# Patient Record
Sex: FEMALE | Race: WHITE | NOT HISPANIC OR LATINO | Employment: OTHER | URBAN - METROPOLITAN AREA
[De-identification: names, ages, dates, MRNs, and addresses within clinical notes are randomized per-mention and may not be internally consistent; named-entity substitution may affect disease eponyms.]

---

## 2017-01-16 ENCOUNTER — HOSPITAL ENCOUNTER (OUTPATIENT)
Dept: LAB | Facility: MEDICAL CENTER | Age: 67
End: 2017-01-16
Attending: INTERNAL MEDICINE
Payer: MEDICARE

## 2017-01-16 DIAGNOSIS — E55.9 VITAMIN D DEFICIENCY DISEASE: ICD-10-CM

## 2017-01-16 DIAGNOSIS — D64.9 ANEMIA, UNSPECIFIED TYPE: ICD-10-CM

## 2017-01-16 DIAGNOSIS — T86.10 COMPLICATION OF TRANSPLANTED KIDNEY, UNSPECIFIED COMPLICATION: ICD-10-CM

## 2017-01-16 LAB
25(OH)D3 SERPL-MCNC: 19 NG/ML (ref 30–100)
ANION GAP SERPL CALC-SCNC: 7 MMOL/L (ref 0–11.9)
BUN SERPL-MCNC: 62 MG/DL (ref 8–22)
CALCIUM SERPL-MCNC: 9.9 MG/DL (ref 8.5–10.5)
CHLORIDE SERPL-SCNC: 113 MMOL/L (ref 96–112)
CO2 SERPL-SCNC: 15 MMOL/L (ref 20–33)
CREAT SERPL-MCNC: 2.27 MG/DL (ref 0.5–1.4)
ERYTHROCYTE [DISTWIDTH] IN BLOOD BY AUTOMATED COUNT: 51.5 FL (ref 35.9–50)
GLUCOSE SERPL-MCNC: 116 MG/DL (ref 65–99)
HCT VFR BLD AUTO: 46 % (ref 37–47)
HGB BLD-MCNC: 15.1 G/DL (ref 12–16)
MCH RBC QN AUTO: 31.2 PG (ref 27–33)
MCHC RBC AUTO-ENTMCNC: 32.8 G/DL (ref 33.6–35)
MCV RBC AUTO: 95 FL (ref 81.4–97.8)
PLATELET # BLD AUTO: 218 K/UL (ref 164–446)
PMV BLD AUTO: 11.5 FL (ref 9–12.9)
POTASSIUM SERPL-SCNC: 5.2 MMOL/L (ref 3.6–5.5)
PTH-INTACT SERPL-MCNC: 93.4 PG/ML (ref 14–72)
RBC # BLD AUTO: 4.84 M/UL (ref 4.2–5.4)
SODIUM SERPL-SCNC: 135 MMOL/L (ref 135–145)
WBC # BLD AUTO: 4.7 K/UL (ref 4.8–10.8)

## 2017-01-16 PROCEDURE — 83970 ASSAY OF PARATHORMONE: CPT

## 2017-01-16 PROCEDURE — 82306 VITAMIN D 25 HYDROXY: CPT

## 2017-01-16 PROCEDURE — 36415 COLL VENOUS BLD VENIPUNCTURE: CPT

## 2017-01-16 PROCEDURE — 80048 BASIC METABOLIC PNL TOTAL CA: CPT

## 2017-01-16 PROCEDURE — 85027 COMPLETE CBC AUTOMATED: CPT

## 2017-01-19 ENCOUNTER — HOSPITAL ENCOUNTER (OUTPATIENT)
Dept: LAB | Facility: MEDICAL CENTER | Age: 67
End: 2017-01-19
Attending: INTERNAL MEDICINE
Payer: MEDICARE

## 2017-01-19 ENCOUNTER — OFFICE VISIT (OUTPATIENT)
Dept: NEPHROLOGY | Facility: MEDICAL CENTER | Age: 67
End: 2017-01-19
Payer: MEDICARE

## 2017-01-19 VITALS
HEIGHT: 62 IN | HEART RATE: 60 BPM | DIASTOLIC BLOOD PRESSURE: 68 MMHG | SYSTOLIC BLOOD PRESSURE: 100 MMHG | TEMPERATURE: 97.6 F | WEIGHT: 133 LBS | BODY MASS INDEX: 24.48 KG/M2 | RESPIRATION RATE: 12 BRPM

## 2017-01-19 DIAGNOSIS — I10 ESSENTIAL HYPERTENSION: ICD-10-CM

## 2017-01-19 DIAGNOSIS — N17.9 ACUTE ON CHRONIC RENAL FAILURE (HCC): ICD-10-CM

## 2017-01-19 DIAGNOSIS — N18.9 ACUTE ON CHRONIC RENAL FAILURE (HCC): ICD-10-CM

## 2017-01-19 DIAGNOSIS — E87.20 METABOLIC ACIDOSIS: ICD-10-CM

## 2017-01-19 DIAGNOSIS — T86.10 COMPLICATION OF TRANSPLANTED KIDNEY, UNSPECIFIED COMPLICATION: ICD-10-CM

## 2017-01-19 LAB
ANION GAP SERPL CALC-SCNC: 7 MMOL/L (ref 0–11.9)
BUN SERPL-MCNC: 63 MG/DL (ref 8–22)
CALCIUM SERPL-MCNC: 9.8 MG/DL (ref 8.5–10.5)
CHLORIDE SERPL-SCNC: 113 MMOL/L (ref 96–112)
CO2 SERPL-SCNC: 18 MMOL/L (ref 20–33)
CREAT SERPL-MCNC: 2.11 MG/DL (ref 0.5–1.4)
GLUCOSE SERPL-MCNC: 90 MG/DL (ref 65–99)
POTASSIUM SERPL-SCNC: 5.2 MMOL/L (ref 3.6–5.5)
SODIUM SERPL-SCNC: 138 MMOL/L (ref 135–145)

## 2017-01-19 PROCEDURE — 99214 OFFICE O/P EST MOD 30 MIN: CPT | Performed by: INTERNAL MEDICINE

## 2017-01-19 PROCEDURE — 36415 COLL VENOUS BLD VENIPUNCTURE: CPT

## 2017-01-19 PROCEDURE — 80048 BASIC METABOLIC PNL TOTAL CA: CPT

## 2017-01-19 NOTE — MR AVS SNAPSHOT
"        Jo-Ann Cabrera   2017 10:45 AM   Office Visit   MRN: [id number]    Department:  [facility]   Dept Phone:  [phone]    Description:  Female : 1950   Provider:  Hiwot Brenner M.D.           Reason for Visit     Follow-Up           Allergies as of 2017     Allergen Noted Reactions    Codeine 2013   Rash    Rxn = unknown    Rapamune 2013       unk reaction    Statins [Hmg-Coa-R Inhibitors] 2015       LFT elevation    Pneumococcal Vaccine 2014       States cold symptoms      You were diagnosed with     Acute on chronic renal failure (CMS-HCC)   [592296]       Complication of transplanted kidney, unspecified complication   [3016046]       Metabolic acidosis   [221218]       Essential hypertension   [9613867]         Vital Signs     Blood Pressure Pulse Temperature Respirations Height Weight    100/68 mmHg 60 36.4 °C (97.6 °F) (Temporal) 12 1.575 m (5' 2\") 60.328 kg (133 lb)    Body Mass Index Last Menstrual Period Smoking Status             24.32 kg/m2 2000 Never Smoker          Basic Information     Date Of Birth Sex Race Ethnicity Preferred Language    1950 Female White Non- English      Your appointments     2017 10:00 AM   Follow Up Visit with Hiwot Brenner M.D.   [facility] ([address] [place] [address])    [facility] [address]  [facility] [id number] [phone]           You will be receiving a confirmation call a few days before your appointment from our automated call confirmation system.              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 T86.10 Medium  2013 - Present    Coronary artery disease I25.10 Medium  2013 - Present    GERD (gastroesophageal reflux disease) K21.9 Low  2013 - Present    CAD S/P percutaneous coronary angioplasty I25.10, Z98.61   2013 - Present    Immunosuppressed " status ([facility]) D89.9 Medium  5/21/2014 - Present    Acute on chronic renal failure (HCC) N17.9, N18.9 High  8/8/2014 - Present    Anemia of renal disease D63.1 Low  8/8/2014 - Present    Hypokalemia E87.6 Medium  8/8/2014 - Present    Leukopenia D72.819 Medium  8/8/2014 - Present    Hypocalcemia E83.51 Low  8/8/2014 - Present    Hypoalbuminemia E88.09 Low  8/8/2014 - Present    Gastroenteritis K52.9 High  8/8/2014 - Present    Dyslipidemia E78.5 Low  8/8/2014 - Present    DM retinopathy (CMS-HCC) E11.319 Low  8/28/2014 - Present    Lower urinary tract infectious disease N39.0 High  9/21/2014 - Present    Metabolic acidosis E87.2 Medium  10/2/2014 - Present    Anemia D64.9 Medium  10/2/2014 - Present    HTN (hypertension) I10 Low  11/6/2014 - Present    UTI N39.0 High  11/20/2014 - Present    Intractable nausea and vomiting R11.2   11/20/2014 - Present    Hypokalemia E87.6   12/10/2014 - Present    Chronic kidney disease (CKD), stage III (moderate) N18.3   1/6/2015 - Present    CKD (chronic kidney disease), stage III N18.3   3/13/2015 - Present    DVT (deep venous thrombosis) (CMS-HCC) I82.409 High  11/13/2015 - Present    DVT (deep venous thrombosis), left I82.402   12/21/2015 - Present    Hyperparathyroidism due to vitamin D deficiency (CMS-HCC) E21.1   4/7/2016 - Present    Vitamin deficiency E56.9   4/7/2016 - Present    Hyponatremia E87.1 Medium  7/25/2016 - Present    IRISH (acute kidney injury) (CMS-HCC) N17.9 High  7/26/2016 - Present    CKD (chronic kidney disease) N18.9 Medium  7/26/2016 - Present    Immunosuppressed status (CMS-HCC) D89.9 Low  7/26/2016 - Present    History of deep venous thrombosis Z86.718 Low  7/26/2016 - Present    GERD (gastroesophageal reflux disease) K21.9 Low  7/26/2016 - Present    CAD (coronary artery disease) I25.10 Medium  7/26/2016 - Present    Glaucoma H40.9 Low  7/26/2016 - Present    History of renal transplantation Z94.0 Low  7/26/2016 - Present    Vitamin D deficiency disease  E55.9   11/17/2016 - Present    ARF (acute renal failure) (CMS-HCC) N17.9   11/17/2016 - Present    DM type 1 (diabetes mellitus, type 1) (CMS-HCC) E10.9 Medium  11/18/2016 - Present    Type 1 diabetes mellitus with kidney complication (CMS-HCC) E10.29   12/1/2016 - Present    Essential hypertension I10   1/19/2017 - Present      Health Maintenance        Date Due Completion Dates    A1C SCREENING 1950 ---    DIABETES MONOFILAMENT / LE EXAM 1950 ---    URINE ACR / MICROALBUMIN 6/23/1966 ---    IMM DTaP/Tdap/Td Vaccine (1 - Tdap) 6/23/1967 ---    PAP SMEAR 6/23/1969 ---    IMM ZOSTER VACCINE 6/23/2008 ---    BONE DENSITY 6/23/2013 ---    RETINAL SCREENING 2/10/2015 2/10/2014 (Prv Comp)    Override on 2/10/2014: Previously completed    IMM PNEUMOCOCCAL 65+ (ADULT) HIGH/HIGHEST RISK SERIES (2 of 2 - PCV13) 9/22/2015 9/22/2014    IMM INFLUENZA (1) 9/1/2016 10/13/2015, 11/22/2014, 11/25/2013    MAMMOGRAM 5/27/2017 5/27/2016, 5/14/2015, 4/9/2014, 4/9/2014, 4/9/2014, 4/9/2014, 4/2/2014, 3/25/2012 (Prv Comp)    Override on 3/25/2012: Previously completed    FASTING LIPID PROFILE 6/14/2017 6/14/2016, 11/18/2015, 7/9/2015, 6/30/2015, 3/6/2015, 5/22/2014, 11/20/2013    SERUM CREATININE 1/16/2018 1/16/2017, 12/27/2016, 11/20/2016, 11/19/2016, 11/18/2016, 11/17/2016, 11/17/2016, 9/22/2016, 8/9/2016, 7/27/2016, 7/26/2016, 7/25/2016, 7/24/2016, 6/14/2016, 6/14/2016, 6/14/2016, 3/15/2016, 12/15/2015, 11/17/2015, 11/16/2015, 11/15/2015, 11/14/2015, 11/13/2015, 10/1/2015, 9/1/2015, 8/25/2015, 7/9/2015, 6/30/2015, 6/30/2015, 3/6/2015, 3/6/2015, 1/2/2015, 12/11/2014, 12/10/2014, 12/9/2014, 12/8/2014, 12/7/2014, 12/7/2014, 12/6/2014, 12/3/2014, 12/3/2014, 11/26/2014, 11/25/2014, 11/24/2014, 11/23/2014, 11/22/2014, 11/21/2014, 11/21/2014, 11/20/2014, 10/30/2014, 9/29/2014, 9/25/2014, 9/24/2014, 9/23/2014, 9/22/2014, 9/21/2014, 8/13/2014, 8/12/2014, 8/11/2014, 8/10/2014, 8/9/2014, 8/8/2014, 8/7/2014, 7/29/2014, 6/2/2014,  5/29/2014, 5/23/2014, 5/22/2014, 5/21/2014, 12/16/2013, 12/9/2013, 11/21/2013, 11/20/2013, 11/19/2013, 10/31/2013, 9/11/2013    COLONOSCOPY 11/25/2020 11/25/2010 (Prv Comp)    Override on 11/25/2010: Previously completed            Current Immunizations     Influenza TIV (IM) 11/25/2013    Influenza Vaccine Adult HD 10/13/2015, 11/22/2014  9:53 AM    Pneumococcal polysaccharide vaccine (PPSV-23) 9/22/2014 12:45 AM      Below and/or attached are the medications your provider expects you to take. Review all of your home medications and newly ordered medications with your provider and/or pharmacist. Follow medication instructions as directed by your provider and/or pharmacist. Please keep your medication list with you and share with your provider. Update the information when medications are discontinued, doses are changed, or new medications (including over-the-counter products) are added; and carry medication information at all times in the event of emergency situations     Allergies:  CODEINE - Rash     RAPAMUNE - (reactions not documented)     STATINS - (reactions not documented)     PNEUMOCOCCAL VACCINE - (reactions not documented)               Medications  Valid as of: January 19, 2017 - 11:06 AM    Generic Name Brand Name Tablet Size Instructions for use    Aspirin (Chew Tab) ASA 81 MG Take 81 mg by mouth every morning.        Cholecalciferol (Tab) cholecalciferol 1000 UNIT Take 1,000 Units by mouth every day.        Dorzolamide HCl-Timolol Mal (Solution) COSOPT 22.3-6.8 MG/ML Place 1 Drop in both eyes every day.        Fludrocortisone Acetate (Tab) FLORINEF 0.1 MG Take 1 Tab by mouth every morning.        Metoprolol Tartrate (Tab) LOPRESSOR 50 MG TAKE ONE-HALF TABLET BY MOUTH TWICE DAILY        Multiple Vitamins-Minerals   Take 1 Cap by mouth every day.        Omeprazole (CAPSULE DELAYED RELEASE) PRILOSEC 40 MG Take 1 Cap by mouth 2 Times a Day.        PredniSONE (Tab) DELTASONE 10 MG Take 10 mg by mouth every  day.        Pyridoxine HCl   Take 1 Tab by mouth every day.        Sodium Bicarbonate   Take 10 g by mouth 3 times a day.        Tacrolimus (Cap) PROGRAF 1 MG Take 2 mg by mouth 2 times a day.        .                 Medicines prescribed today were sent to:     [facility] [address] [place] [address], [place] - [address] [facility]    [address] [facility] [place] [address]    Phone: [phone] Fax: [phone]    Open 24 Hours?: No      Medication refill instructions:       If your prescription bottle indicates you have medication refills left, it is not necessary to call your provider’s office. Please contact your pharmacy and they will refill your medication.    If your prescription bottle indicates you do not have any refills left, you may request refills at any time through one of the following ways: The online Clear2Pay system (except Urgent Care), by calling your provider’s office, or by asking your pharmacy to contact your provider’s office with a refill request. Medication refills are processed only during regular business hours and may not be available until the next business day. Your provider may request additional information or to have a follow-up visit with you prior to refilling your medication.   *Please Note: Medication refills are assigned a new Rx number when refilled electronically. Your pharmacy may indicate that no refills were authorized even though a new prescription for the same medication is available at the pharmacy. Please request the medicine by name with the pharmacy before contacting your provider for a refill.        Your To Do List     Future Labs/Procedures Complete By Expires    BASIC METABOLIC PANEL  As directed 7/19/2017         Clear2Pay Access Code: [email or web address]  Expires: 2/15/2017  1:37 PM    Clear2Pay  A secure, online tool to manage your health information     [employer]’s Clear2Pay® is a secure, online tool that connects you to your personalized health information from the privacy  of your home -- day or night - making it very easy for you to manage your healthcare. Once the activation process is completed, you can even access your medical information using the Cloud Security lavern, which is available for free in the Apple Lavern store or Google Play store.     Cloud Security provides the following levels of access (as shown below):   My Chart Features   [facility] Doctor [facility]  Specialists [facility]  Urgent  Care [facility]  Primary Care  Doctor   Email your healthcare team securely and privately 24/7 X X X    Manage appointments: schedule your next appointment; view details of past/upcoming appointments X      Request prescription refills. X      View recent personal medical records, including lab and immunizations X X X X   View health record, including health history, allergies, medications X X X X   Read reports about your outpatient visits, procedures, consult and ER notes X X X X   See your discharge summary, which is a recap of your hospital and/or ER visit that includes your diagnosis, lab results, and care plan. X X       How to register for Cloud Security:  1. Go to  [email or web address].  2. Click on the Sign Up Now box, which takes you to the New Member Sign Up page. You will need to provide the following information:  a. Enter your Cloud Security Access Code exactly as it appears at the top of this page. (You will not need to use this code after you’ve completed the sign-up process. If you do not sign up before the expiration date, you must request a new code.)   b. Enter your date of birth.   c. Enter your home email address.   d. Click Submit, and follow the next screen’s instructions.  3. Create a Cloud Security ID. This will be your Cloud Security login ID and cannot be changed, so think of one that is secure and easy to remember.  4. Create a Cloud Security password. You can change your password at any time.  5. Enter your Password Reset Question and Answer. This can be used at a later time if you forget your  password.   6. Enter your e-mail address. This allows you to receive e-mail notifications when new information is available in Five Below.  7. Click Sign Up. You can now view your health information.    For assistance activating your [employer] Below account, call [phone]

## 2017-01-19 NOTE — PROGRESS NOTES
"Subjective:      Jo-Ann Cabrera is a 66 y.o. female who presents with Follow-Up; Kidney Transplant Follow-Up; and Chronic Kidney Disease            HPI  Jo-Ann is coming today for f/u of CKD III,K/P transplant,  metabolic acidosis  Has hx/of dual renal/pancrease transplant in 2007, [place]  Recently treated with abx for bladder infection  No dysuria/hematuria  CKD III:creat worse to 2.2  HTN: BP well controlled.  Metabolic acidosis worse  Review of Systems   Constitutional: Improved malaise/fatigue. Negative for fever and chills.   HENT: Negative for congestion and neck pain.    Eyes: Negative for blurred vision and double vision.   Respiratory: Negative for cough, hemoptysis and wheezing.    Cardiovascular: Negative for chest pain, palpitations, orthopnea and leg swelling.   Gastrointestinal: Positive for nausea, vomiting and abdominal pain.   Genitourinary: Positivefor dysuria, urgency, frequency, hematuria and flank pain.   Musculoskeletal: Negative for myalgias and back pain.   Neurological: Negative for dizziness and headaches.     PMH/SH/FH/allergies and medications reviewed     Objective:     /68 mmHg  Pulse 60  Temp(Src) 36.4 °C (97.6 °F) (Temporal)  Resp 12  Ht 1.575 m (5' 2\")  Wt 60.328 kg (133 lb)  BMI 24.32 kg/m2  LMP 11/08/2000     Physical Exam   Constitutional: She is oriented to person, place, and time. She appears well-developed. No distress.        Thin female   HENT:   Head: Normocephalic and atraumatic.   Nose: Nose normal.   Mouth/Throat: Oropharynx is clear and moist.   Eyes: Conjunctivae normal and EOM are normal. Pupils are equal, round, and reactive to light. No scleral icterus.   Neck: Normal range of motion. Neck supple. No JVD present.   Cardiovascular: Normal rate and regular rhythm.  Exam reveals no gallop and no friction rub.    Pulmonary/Chest: Effort normal and breath sounds normal. No respiratory distress. She has no wheezes. She has no rales.   Abdominal: Soft. " Bowel sounds are normal. She exhibits no distension. There is tenderness in epigastric area   Musculoskeletal: She exhibits no edema and no tenderness.   Lymphadenopathy:     She has no cervical adenopathy.   Neurological: She is alert and oriented to person, place, and time. No cranial nerve deficit. Coordination normal.             Laboratory results reviewed: d/w patient  Lab Results   Component Value Date/Time    CREATININE 1.22 6/2/2014  8:04 AM    POTASSIUM 4.1 6/2/2014  8:04 AM   Serum creatinine level from 06/23/14 1.6, Hb 10    Lab Results   Component Value Date/Time    CREATININE 1.63* 9/29/2014  9:30 AM    POTASSIUM 4.2 9/29/2014  9:30 AM     Lab Results   Component Value Date/Time    CREATININE 2.07* 10/30/2014 11:58 AM    POTASSIUM 4.2 10/30/2014 11:58 AM   CO2 14    Lab Results   Component Value Date/Time    CREATININE 1.54* 1/2/2015 12:05 PM    POTASSIUM 4.5 1/2/2015 12:05 PM   CO2 15    Lab Results   Component Value Date/Time    CREATININE 1.40 3/6/2015  9:40 AM    POTASSIUM 4.2 3/6/2015  9:40 AM   CO2 21    Lab Results   Component Value Date/Time    CREATININE 2.27* 01/16/2017 01:45 PM    POTASSIUM 5.2 01/16/2017 01:45 PM               Assessment/Plan:     1. 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 Chronic kidney disease (CKD), stage III (moderate)  -in IRISH              Creatinine worse -likely volume depletion        3. HTN (hypertension)   BP well controlled    4. Anemia   Hb level stable    5. Vitamin D deficiency disease  - vit D level below the goal -to monitor              Continue supplementation  6.         Metabolic acidosis - worse -check BMP today      Recs: NaHCO3 1300 mg po TID             Check BMP today if creat/CO2 worse -instructed to go to ER for further treatment             Keep well hydrated             F/u in 2-3  months with BMP

## 2017-01-23 DIAGNOSIS — E87.20 METABOLIC ACIDOSIS: ICD-10-CM

## 2017-01-23 DIAGNOSIS — N17.9 AKI (ACUTE KIDNEY INJURY) (HCC): ICD-10-CM

## 2017-01-23 DIAGNOSIS — T86.10 COMPLICATION OF TRANSPLANTED KIDNEY, UNSPECIFIED COMPLICATION: ICD-10-CM

## 2017-01-23 DIAGNOSIS — N18.30 CHRONIC KIDNEY DISEASE (CKD), STAGE III (MODERATE) (HCC): ICD-10-CM

## 2017-01-26 ENCOUNTER — HOSPITAL ENCOUNTER (OUTPATIENT)
Dept: LAB | Facility: MEDICAL CENTER | Age: 67
End: 2017-01-26
Attending: INTERNAL MEDICINE
Payer: MEDICARE

## 2017-01-26 DIAGNOSIS — E87.20 METABOLIC ACIDOSIS: ICD-10-CM

## 2017-01-26 DIAGNOSIS — N18.30 CHRONIC KIDNEY DISEASE (CKD), STAGE III (MODERATE) (HCC): ICD-10-CM

## 2017-01-26 DIAGNOSIS — N17.9 AKI (ACUTE KIDNEY INJURY) (HCC): ICD-10-CM

## 2017-01-26 DIAGNOSIS — T86.10 COMPLICATION OF TRANSPLANTED KIDNEY, UNSPECIFIED COMPLICATION: ICD-10-CM

## 2017-01-26 LAB
ANION GAP SERPL CALC-SCNC: 6 MMOL/L (ref 0–11.9)
BUN SERPL-MCNC: 53 MG/DL (ref 8–22)
CALCIUM SERPL-MCNC: 9.4 MG/DL (ref 8.5–10.5)
CHLORIDE SERPL-SCNC: 118 MMOL/L (ref 96–112)
CO2 SERPL-SCNC: 16 MMOL/L (ref 20–33)
CREAT SERPL-MCNC: 2.06 MG/DL (ref 0.5–1.4)
GLUCOSE SERPL-MCNC: 86 MG/DL (ref 65–99)
POTASSIUM SERPL-SCNC: 5.7 MMOL/L (ref 3.6–5.5)
SODIUM SERPL-SCNC: 140 MMOL/L (ref 135–145)

## 2017-01-26 PROCEDURE — 80048 BASIC METABOLIC PNL TOTAL CA: CPT

## 2017-01-26 PROCEDURE — 36415 COLL VENOUS BLD VENIPUNCTURE: CPT

## 2017-02-07 ENCOUNTER — HOSPITAL ENCOUNTER (OUTPATIENT)
Facility: MEDICAL CENTER | Age: 67
End: 2017-02-07
Attending: PHYSICIAN ASSISTANT
Payer: MEDICARE

## 2017-02-07 PROCEDURE — 87077 CULTURE AEROBIC IDENTIFY: CPT

## 2017-02-07 PROCEDURE — 87186 SC STD MICRODIL/AGAR DIL: CPT

## 2017-02-07 PROCEDURE — 87086 URINE CULTURE/COLONY COUNT: CPT

## 2017-02-09 LAB
BACTERIA UR CULT: ABNORMAL
SIGNIFICANT IND 70042: ABNORMAL
SOURCE SOURCE: ABNORMAL

## 2017-02-16 ENCOUNTER — TELEPHONE (OUTPATIENT)
Dept: NEPHROLOGY | Facility: MEDICAL CENTER | Age: 67
End: 2017-02-16

## 2017-02-16 NOTE — TELEPHONE ENCOUNTER
1. Caller Name: Jo-Ann Cabrera                      Call Back Number: [phone]    2. Message: Pt has appt next week Thursday. Last note mentions that you want to have more labs beforehand. She used the order for the lab that wanted the same day as her appt. Please place order for new labs for next appt. I will call pt to let her know when they are in. She will go to a [facility] lab.     3. Patient approves office to leave a detailed voicemail/MyChart message: yes

## 2017-02-18 DIAGNOSIS — N18.30 CKD (CHRONIC KIDNEY DISEASE), STAGE III (HCC): ICD-10-CM

## 2017-02-18 DIAGNOSIS — N17.9 AKI (ACUTE KIDNEY INJURY) (HCC): ICD-10-CM

## 2017-02-21 ENCOUNTER — HOSPITAL ENCOUNTER (OUTPATIENT)
Dept: LAB | Facility: MEDICAL CENTER | Age: 67
End: 2017-02-21
Attending: INTERNAL MEDICINE
Payer: MEDICARE

## 2017-02-21 DIAGNOSIS — N17.9 AKI (ACUTE KIDNEY INJURY) (HCC): ICD-10-CM

## 2017-02-21 DIAGNOSIS — N18.30 CKD (CHRONIC KIDNEY DISEASE), STAGE III (HCC): ICD-10-CM

## 2017-02-21 LAB
ANION GAP SERPL CALC-SCNC: 10 MMOL/L (ref 0–11.9)
BUN SERPL-MCNC: 34 MG/DL (ref 8–22)
CALCIUM SERPL-MCNC: 9.6 MG/DL (ref 8.5–10.5)
CHLORIDE SERPL-SCNC: 106 MMOL/L (ref 96–112)
CO2 SERPL-SCNC: 23 MMOL/L (ref 20–33)
CREAT SERPL-MCNC: 1.4 MG/DL (ref 0.5–1.4)
GLUCOSE SERPL-MCNC: 67 MG/DL (ref 65–99)
POTASSIUM SERPL-SCNC: 5 MMOL/L (ref 3.6–5.5)
SODIUM SERPL-SCNC: 139 MMOL/L (ref 135–145)

## 2017-02-21 PROCEDURE — 36415 COLL VENOUS BLD VENIPUNCTURE: CPT

## 2017-02-21 PROCEDURE — 80048 BASIC METABOLIC PNL TOTAL CA: CPT

## 2017-02-22 ENCOUNTER — ANTICOAGULATION MONITORING (OUTPATIENT)
Dept: VASCULAR LAB | Facility: MEDICAL CENTER | Age: 67
End: 2017-02-22

## 2017-02-22 DIAGNOSIS — I82.401 ACUTE DEEP VEIN THROMBOSIS (DVT) OF RIGHT LOWER EXTREMITY, UNSPECIFIED VEIN (HCC): ICD-10-CM

## 2017-02-22 NOTE — PROGRESS NOTES
Discharged from [facility].  Anticoagulation managed by Omero Alaniz as of 11/24/2015    Colleen Carlos, PHARMD

## 2017-02-23 ENCOUNTER — OFFICE VISIT (OUTPATIENT)
Dept: NEPHROLOGY | Facility: MEDICAL CENTER | Age: 67
End: 2017-02-23
Payer: MEDICARE

## 2017-02-23 VITALS
BODY MASS INDEX: 25.95 KG/M2 | SYSTOLIC BLOOD PRESSURE: 132 MMHG | HEIGHT: 62 IN | DIASTOLIC BLOOD PRESSURE: 60 MMHG | WEIGHT: 141 LBS | RESPIRATION RATE: 12 BRPM | HEART RATE: 67 BPM | TEMPERATURE: 99 F

## 2017-02-23 DIAGNOSIS — I15.0 RENOVASCULAR HYPERTENSION: ICD-10-CM

## 2017-02-23 DIAGNOSIS — N18.30 CHRONIC KIDNEY DISEASE (CKD), STAGE III (MODERATE) (HCC): ICD-10-CM

## 2017-02-23 DIAGNOSIS — N17.9 AKI (ACUTE KIDNEY INJURY) (HCC): ICD-10-CM

## 2017-02-23 DIAGNOSIS — E87.20 METABOLIC ACIDOSIS: ICD-10-CM

## 2017-02-23 DIAGNOSIS — E21.1 HYPERPARATHYROIDISM DUE TO VITAMIN D DEFICIENCY (HCC): ICD-10-CM

## 2017-02-23 DIAGNOSIS — E10.29 TYPE 1 DIABETES MELLITUS WITH OTHER KIDNEY COMPLICATION (HCC): ICD-10-CM

## 2017-02-23 DIAGNOSIS — T86.10 COMPLICATION OF TRANSPLANTED KIDNEY, UNSPECIFIED COMPLICATION: ICD-10-CM

## 2017-02-23 DIAGNOSIS — D64.9 ANEMIA, UNSPECIFIED TYPE: ICD-10-CM

## 2017-02-23 PROCEDURE — G8484 FLU IMMUNIZE NO ADMIN: HCPCS | Performed by: INTERNAL MEDICINE

## 2017-02-23 PROCEDURE — G8598 ASA/ANTIPLAT THER USED: HCPCS | Performed by: INTERNAL MEDICINE

## 2017-02-23 PROCEDURE — 1101F PT FALLS ASSESS-DOCD LE1/YR: CPT | Mod: 8P | Performed by: INTERNAL MEDICINE

## 2017-02-23 PROCEDURE — 3046F HEMOGLOBIN A1C LEVEL >9.0%: CPT | Mod: 8P | Performed by: INTERNAL MEDICINE

## 2017-02-23 PROCEDURE — 1036F TOBACCO NON-USER: CPT | Performed by: INTERNAL MEDICINE

## 2017-02-23 PROCEDURE — 3017F COLORECTAL CA SCREEN DOC REV: CPT | Performed by: INTERNAL MEDICINE

## 2017-02-23 PROCEDURE — 4040F PNEUMOC VAC/ADMIN/RCVD: CPT | Performed by: INTERNAL MEDICINE

## 2017-02-23 PROCEDURE — 99214 OFFICE O/P EST MOD 30 MIN: CPT | Performed by: INTERNAL MEDICINE

## 2017-02-23 PROCEDURE — G8420 CALC BMI NORM PARAMETERS: HCPCS | Performed by: INTERNAL MEDICINE

## 2017-02-23 PROCEDURE — G8432 DEP SCR NOT DOC, RNG: HCPCS | Performed by: INTERNAL MEDICINE

## 2017-02-23 PROCEDURE — 3014F SCREEN MAMMO DOC REV: CPT | Performed by: INTERNAL MEDICINE

## 2017-02-23 NOTE — MR AVS SNAPSHOT
"        Jo-Ann Cabrera   2017 10:00 AM   Office Visit   MRN: [id number]    Department:  [facility]   Dept Phone:  [phone]    Description:  Female : 1950   Provider:  Hiwot Brenner M.D.           Reason for Visit     Follow-Up           Allergies as of 2017     Allergen Noted Reactions    Codeine 2013   Rash    Rxn = unknown    Rapamune 2013       unk reaction    Statins [Hmg-Coa-R Inhibitors] 2015       LFT elevation    Pneumococcal Vaccine 2014       States cold symptoms      You were diagnosed with     IRISH (acute kidney injury) (CMS-HCC)   [596548]       Complication of transplanted kidney, unspecified complication   [9520251]       Metabolic acidosis   [341131]       Anemia, unspecified type   [0423218]       Type 1 diabetes mellitus with other kidney complication (CMS-HCC)   [0408523]       Renovascular hypertension   [402518]       Chronic kidney disease (CKD), stage III (moderate)   [745345]       Hyperparathyroidism due to vitamin D deficiency (CMS-HCC)   [125474]         Vital Signs     Blood Pressure Pulse Temperature Respirations Height Weight    132/60 mmHg 67 37.2 °C (99 °F) (Temporal) 12 1.575 m (5' 2\") 63.957 kg (141 lb)    Body Mass Index Last Menstrual Period Smoking Status             25.78 kg/m2 2000 Never Smoker          Basic Information     Date Of Birth Sex Race Ethnicity Preferred Language    1950 Female White Non- English      Your appointments     2017 10:45 AM   Follow Up Visit with Hiwot Brenner M.D.   [facility] ([facility])    [address] [place] [address], [address]  [facility] [address]   [phone]           You will be receiving a confirmation call a few days before your appointment from our automated call confirmation system.              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 T86.10 Medium "  11/19/2013 - Present    Coronary artery disease I25.10 Medium  11/19/2013 - Present    GERD (gastroesophageal reflux disease) K21.9 Low  11/20/2013 - Present    CAD S/P percutaneous coronary angioplasty I25.10, Z98.61   11/25/2013 - Present    Immunosuppressed status ([facility]) D89.9 Medium  5/21/2014 - Present    Acute on chronic renal failure (HCC) N17.9, N18.9 High  8/8/2014 - Present    Anemia of renal disease D63.1 Low  8/8/2014 - Present    Hypokalemia E87.6 Medium  8/8/2014 - Present    Leukopenia D72.819 Medium  8/8/2014 - Present    Hypocalcemia E83.51 Low  8/8/2014 - Present    Hypoalbuminemia E88.09 Low  8/8/2014 - Present    Gastroenteritis K52.9 High  8/8/2014 - Present    Dyslipidemia E78.5 Low  8/8/2014 - Present    DM retinopathy (CMS-HCC) E11.319 Low  8/28/2014 - Present    Lower urinary tract infectious disease N39.0 High  9/21/2014 - Present    Metabolic acidosis E87.2 Medium  10/2/2014 - Present    Anemia D64.9 Medium  10/2/2014 - Present    HTN (hypertension) I10 Low  11/6/2014 - Present    UTI N39.0 High  11/20/2014 - Present    Intractable nausea and vomiting R11.2   11/20/2014 - Present    Hypokalemia E87.6   12/10/2014 - Present    Chronic kidney disease (CKD), stage III (moderate) N18.3   1/6/2015 - Present    CKD (chronic kidney disease), stage III N18.3   3/13/2015 - Present    DVT (deep venous thrombosis) (CMS-HCC) I82.409 High  11/13/2015 - Present    DVT (deep venous thrombosis), left I82.402   12/21/2015 - Present    Hyperparathyroidism due to vitamin D deficiency (CMS-HCC) E21.1   4/7/2016 - Present    Vitamin deficiency E56.9   4/7/2016 - Present    Hyponatremia E87.1 Medium  7/25/2016 - Present    IRISH (acute kidney injury) (CMS-HCC) N17.9 High  7/26/2016 - Present    CKD (chronic kidney disease) N18.9 Medium  7/26/2016 - Present    Immunosuppressed status (CMS-HCC) D89.9 Low  7/26/2016 - Present    History of deep venous thrombosis Z86.718 Low  7/26/2016 - Present    GERD  (gastroesophageal reflux disease) K21.9 Low  7/26/2016 - Present    CAD (coronary artery disease) I25.10 Medium  7/26/2016 - Present    Glaucoma H40.9 Low  7/26/2016 - Present    History of renal transplantation Z94.0 Low  7/26/2016 - Present    Vitamin D deficiency disease E55.9   11/17/2016 - Present    ARF (acute renal failure) (CMS-HCC) N17.9   11/17/2016 - Present    DM type 1 (diabetes mellitus, type 1) (CMS-HCC) E10.9 Medium  11/18/2016 - Present    Type 1 diabetes mellitus with kidney complication (CMS-HCC) E10.29   12/1/2016 - Present    Essential hypertension I10   1/19/2017 - Present      Health Maintenance        Date Due Completion Dates    A1C SCREENING 1950 ---    DIABETES MONOFILAMENT / LE EXAM 1950 ---    URINE ACR / MICROALBUMIN 6/23/1966 ---    IMM DTaP/Tdap/Td Vaccine (1 - Tdap) 6/23/1967 ---    PAP SMEAR 6/23/1969 ---    IMM ZOSTER VACCINE 6/23/2008 ---    BONE DENSITY 6/23/2013 ---    RETINAL SCREENING 2/10/2015 2/10/2014 (Prv Comp)    Override on 2/10/2014: Previously completed    IMM PNEUMOCOCCAL 65+ (ADULT) HIGH/HIGHEST RISK SERIES (2 of 2 - PCV13) 9/22/2015 9/22/2014    IMM INFLUENZA (1) 9/1/2016 10/13/2015, 11/22/2014, 11/25/2013    MAMMOGRAM 5/27/2017 5/27/2016, 5/14/2015, 4/9/2014, 4/9/2014, 4/9/2014, 4/9/2014, 4/2/2014, 3/25/2012 (Prv Comp)    Override on 3/25/2012: Previously completed    FASTING LIPID PROFILE 6/14/2017 6/14/2016, 11/18/2015, 7/9/2015, 6/30/2015, 3/6/2015, 5/22/2014, 11/20/2013    SERUM CREATININE 2/21/2018 2/21/2017, 1/26/2017, 1/19/2017, 1/16/2017, 12/27/2016, 11/20/2016, 11/19/2016, 11/18/2016, 11/17/2016, 11/17/2016, 9/22/2016, 8/9/2016, 7/27/2016, 7/26/2016, 7/25/2016, 7/24/2016, 6/14/2016, 6/14/2016, 6/14/2016, 3/15/2016, 12/15/2015, 11/17/2015, 11/16/2015, 11/15/2015, 11/14/2015, 11/13/2015, 10/1/2015, 9/1/2015, 8/25/2015, 7/9/2015, 6/30/2015, 6/30/2015, 3/6/2015, 3/6/2015, 1/2/2015, 12/11/2014, 12/10/2014, 12/9/2014, 12/8/2014, 12/7/2014, 12/7/2014,  12/6/2014, 12/3/2014, 12/3/2014, 11/26/2014, 11/25/2014, 11/24/2014, 11/23/2014, 11/22/2014, 11/21/2014, 11/21/2014, 11/20/2014, 10/30/2014, 9/29/2014, 9/25/2014, 9/24/2014, 9/23/2014, 9/22/2014, 9/21/2014, 8/13/2014, 8/12/2014, 8/11/2014, 8/10/2014, 8/9/2014, 8/8/2014, 8/7/2014, 7/29/2014, 6/2/2014, 5/29/2014, 5/23/2014, 5/22/2014, 5/21/2014, 12/16/2013, 12/9/2013, 11/21/2013, 11/20/2013, 11/19/2013, 10/31/2013, 9/11/2013    COLONOSCOPY 11/25/2020 11/25/2010 (Prv Comp)    Override on 11/25/2010: Previously completed            Current Immunizations     Influenza TIV (IM) 11/25/2013    Influenza Vaccine Adult HD 10/13/2015, 11/22/2014  9:53 AM    Pneumococcal polysaccharide vaccine (PPSV-23) 9/22/2014 12:45 AM      Below and/or attached are the medications your provider expects you to take. Review all of your home medications and newly ordered medications with your provider and/or pharmacist. Follow medication instructions as directed by your provider and/or pharmacist. Please keep your medication list with you and share with your provider. Update the information when medications are discontinued, doses are changed, or new medications (including over-the-counter products) are added; and carry medication information at all times in the event of emergency situations     Allergies:  CODEINE - Rash     RAPAMUNE - (reactions not documented)     STATINS - (reactions not documented)     PNEUMOCOCCAL VACCINE - (reactions not documented)               Medications  Valid as of: February 23, 2017 - 11:05 AM    Generic Name Brand Name Tablet Size Instructions for use    Aspirin (Chew Tab) ASA 81 MG Take 81 mg by mouth every morning.        Cholecalciferol (Tab) cholecalciferol 1000 UNIT Take 1,000 Units by mouth every day.        Dorzolamide HCl-Timolol Mal (Solution) COSOPT 22.3-6.8 MG/ML Place 1 Drop in both eyes every day.        Fludrocortisone Acetate (Tab) FLORINEF 0.1 MG Take 1 Tab by mouth every morning.         Metoprolol Tartrate (Tab) LOPRESSOR 50 MG TAKE ONE-HALF TABLET BY MOUTH TWICE DAILY        Multiple Vitamins-Minerals   Take 1 Cap by mouth every day.        Omeprazole (CAPSULE DELAYED RELEASE) PRILOSEC 40 MG Take 1 Cap by mouth 2 Times a Day.        PredniSONE (Tab) DELTASONE 10 MG Take 10 mg by mouth every day.        Pyridoxine HCl   Take 1 Tab by mouth every day.        Sodium Bicarbonate   Take 10 g by mouth 3 times a day.        Tacrolimus (Cap) PROGRAF 1 MG Take 2 mg by mouth 2 times a day.        .                 Medicines prescribed today were sent to:     [facility] [address] [place] [address] - [id number] [facility] [address]    Phone: [phone] Fax: [phone]    Open 24 Hours?: No      Medication refill instructions:       If your prescription bottle indicates you have medication refills left, it is not necessary to call your provider’s office. Please contact your pharmacy and they will refill your medication.    If your prescription bottle indicates you do not have any refills left, you may request refills at any time through one of the following ways: The online [employer] system (except Urgent Care), by calling your provider’s office, or by asking your pharmacy to contact your provider’s office with a refill request. Medication refills are processed only during regular business hours and may not be available until the next business day. Your provider may request additional information or to have a follow-up visit with you prior to refilling your medication.   *Please Note: Medication refills are assigned a new Rx number when refilled electronically. Your pharmacy may indicate that no refills were authorized even though a new prescription for the same medication is available at the pharmacy. Please request the medicine by name with the pharmacy before contacting your provider for a refill.        Your To Do List     Future Labs/Procedures Complete By Expires    BASIC  METABOLIC PANEL  As directed 8/23/2017    CBC WITHOUT DIFFERENTIAL  As directed 8/23/2017    PTH INTACT (PTH ONLY)  As directed 2/24/2018         eflow Access Code: [email or web address]  Expires: 3/25/2017 11:05 AM    eflow  A secure, online tool to manage your health information     [employer] eflow® is a secure, online tool that connects you to your personalized health information from the privacy of your home -- day or night - making it very easy for you to manage your healthcare. Once the activation process is completed, you can even access your medical information using the eflow lavern, which is available for free in the Apple Lavern store or Google Play store.     eflow provides the following levels of access (as shown below):   My Chart Features   [facility]  Urgent  Care [facility]  Primary Care  Doctor   Email your healthcare team securely and privately 24/7 X X X    Manage appointments: schedule your next appointment; view details of past/upcoming appointments X      Request prescription refills. X      View recent personal medical records, including lab and immunizations X X X X   View health record, including health history, allergies, medications X X X X   Read reports about your outpatient visits, procedures, consult and ER notes X X X X   See your discharge summary, which is a recap of your hospital and/or ER visit that includes your diagnosis, lab results, and care plan. X X       How to register for eflow:  1. Go to  [email or web address].  2. Click on the Sign Up Now box, which takes you to the New Member Sign Up page. You will need to provide the following information:  a. Enter your eflow Access Code exactly as it appears at the top of this page. (You will not need to use this code after you’ve completed the sign-up process. If you do not sign up before the expiration date, you must request a new code.)   b. Enter your date of birth.    c. Enter your home email address.   d. Click Submit, and follow the next screen’s instructions.  3. Create a [employer] ID. This will be your [employer] login ID and cannot be changed, so think of one that is secure and easy to remember.  4. Create a [employer] password. You can change your password at any time.  5. Enter your Password Reset Question and Answer. This can be used at a later time if you forget your password.   6. Enter your e-mail address. This allows you to receive e-mail notifications when new information is available in [employer].  7. Click Sign Up. You can now view your health information.    For assistance activating your [employer] account, call [phone]

## 2017-02-23 NOTE — PROGRESS NOTES
"Subjective:      Jo-Ann Cabrera is a 66 y.o. female who presents with Follow-Up; Chronic Kidney Disease; and Kidney Transplant            HPI  Jo-Ann is coming today for f/u of CKD III,K/P transplant,  metabolic acidosis  Has hx/of dual renal/pancrease transplant in 2007, [facility]  Recently treated with abx for bladder infection  No dysuria/hematuria  CKD III:creat better -to 1.4  HTN: BP well controlled.  Metabolic acidosis well controlled -at 22  Review of Systems   Constitutional: Improved malaise/fatigue. Negative for fever and chills.   HENT: Negative for congestion and neck pain.    Eyes: Negative for blurred vision and double vision.   Respiratory: Negative for cough, hemoptysis and wheezing.    Cardiovascular: Negative for chest pain, palpitations, orthopnea and leg swelling.   Gastrointestinal: Positive for nausea, vomiting and abdominal pain.   Genitourinary: Positivefor dysuria, urgency, frequency, hematuria and flank pain.   Musculoskeletal: Negative for myalgias and back pain.   Neurological: Negative for dizziness and headaches.     PMH/SH/FH/allergies and medications reviewed     Objective:     /60 mmHg  Pulse 67  Temp(Src) 37.2 °C (99 °F) (Temporal)  Resp 12  Ht 1.575 m (5' 2\")  Wt 63.957 kg (141 lb)  BMI 25.78 kg/m2  LMP 11/08/2000     Physical Exam   Constitutional: She is oriented to person, place, and time. She appears well-developed. No distress.        Thin female   HENT:   Head: Normocephalic and atraumatic.   Nose: Nose normal.   Mouth/Throat: Oropharynx is clear and moist.   Eyes: Conjunctivae normal and EOM are normal. Pupils are equal, round, and reactive to light. No scleral icterus.   Neck: Normal range of motion. Neck supple. No JVD present.   Cardiovascular: Normal rate and regular rhythm.  Exam reveals no gallop and no friction rub.    Pulmonary/Chest: Effort normal and breath sounds normal. No respiratory distress. She has no wheezes. She has no rales.   Abdominal: " Soft. Bowel sounds are normal. She exhibits no distension. There is tenderness in epigastric area   Musculoskeletal: She exhibits no edema and no tenderness.   Lymphadenopathy:     She has no cervical adenopathy.   Neurological: She is alert and oriented to person, place, and time. No cranial nerve deficit. Coordination normal.             Laboratory results reviewed: d/w patient  Lab Results   Component Value Date/Time    CREATININE 1.22 6/2/2014  8:04 AM    POTASSIUM 4.1 6/2/2014  8:04 AM   Serum creatinine level from 06/23/14 1.6, Hb 10    Lab Results   Component Value Date/Time    CREATININE 1.63* 9/29/2014  9:30 AM    POTASSIUM 4.2 9/29/2014  9:30 AM     Lab Results   Component Value Date/Time    CREATININE 2.07* 10/30/2014 11:58 AM    POTASSIUM 4.2 10/30/2014 11:58 AM   CO2 14    Lab Results   Component Value Date/Time    CREATININE 1.54* 1/2/2015 12:05 PM    POTASSIUM 4.5 1/2/2015 12:05 PM   CO2 15    Lab Results   Component Value Date/Time    CREATININE 1.40 3/6/2015  9:40 AM    POTASSIUM 4.2 3/6/2015  9:40 AM   CO2 21    Lab Results   Component Value Date/Time    CREATININE 1.40 02/21/2017 11:48 AM    POTASSIUM 5.0 02/21/2017 11:48 AM               Assessment/Plan:     1. 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 Chronic kidney disease (CKD), stage III (moderate)                Creatinine better -to 1.4        3. HTN (hypertension)   BP well controlled    4. Anemia   Hb level stable    5. Vitamin D deficiency disease  - vit D level below the goal -to monitor              Continue supplementation    6.         Metabolic acidosis - corrected      Recs: Continue current treatment             Monitor BP             Keep well hydrated             F/u in 2-3  months with BMP

## 2017-03-21 ENCOUNTER — HOSPITAL ENCOUNTER (OUTPATIENT)
Facility: MEDICAL CENTER | Age: 67
End: 2017-03-21
Attending: PHYSICIAN ASSISTANT
Payer: MEDICARE

## 2017-03-21 PROCEDURE — 87086 URINE CULTURE/COLONY COUNT: CPT

## 2017-03-24 LAB
BACTERIA UR CULT: NORMAL
SIGNIFICANT IND 70042: NORMAL
SOURCE SOURCE: NORMAL

## 2017-03-27 DIAGNOSIS — I10 UNSPECIFIED ESSENTIAL HYPERTENSION: Primary | ICD-10-CM

## 2017-03-27 RX ORDER — METOPROLOL TARTRATE 50 MG/1
TABLET, FILM COATED ORAL
Qty: 90 TAB | Refills: 0 | Status: SHIPPED | OUTPATIENT
Start: 2017-03-27

## 2017-03-30 ENCOUNTER — HOSPITAL ENCOUNTER (OUTPATIENT)
Dept: LAB | Facility: MEDICAL CENTER | Age: 67
End: 2017-03-30
Attending: INTERNAL MEDICINE
Payer: MEDICARE

## 2017-03-30 LAB
AMYLASE SERPL-CCNC: 62 U/L (ref 20–103)
ANION GAP SERPL CALC-SCNC: 6 MMOL/L (ref 0–11.9)
BASOPHILS # BLD AUTO: 0 K/UL (ref 0–0.12)
BASOPHILS NFR BLD AUTO: 0 % (ref 0–1.8)
BUN SERPL-MCNC: 35 MG/DL (ref 8–22)
CALCIUM SERPL-MCNC: 9.5 MG/DL (ref 8.5–10.5)
CHLORIDE SERPL-SCNC: 110 MMOL/L (ref 96–112)
CO2 SERPL-SCNC: 23 MMOL/L (ref 20–33)
CREAT SERPL-MCNC: 1.41 MG/DL (ref 0.5–1.4)
EOSINOPHIL # BLD: 0 K/UL (ref 0–0.51)
EOSINOPHIL NFR BLD AUTO: 0 % (ref 0–6.9)
ERYTHROCYTE [DISTWIDTH] IN BLOOD BY AUTOMATED COUNT: 49.3 FL (ref 35.9–50)
GLUCOSE SERPL-MCNC: 71 MG/DL (ref 65–99)
HCT VFR BLD AUTO: 45 % (ref 37–47)
HGB BLD-MCNC: 14.5 G/DL (ref 12–16)
IMM GRANULOCYTES # BLD AUTO: 0.01 K/UL (ref 0–0.11)
IMM GRANULOCYTES NFR BLD AUTO: 0.2 % (ref 0–0.9)
LYMPHOCYTES # BLD: 2.09 K/UL (ref 1–4.8)
LYMPHOCYTES NFR BLD AUTO: 38.3 % (ref 22–41)
MAGNESIUM SERPL-MCNC: 1.7 MG/DL (ref 1.5–2.5)
MCH RBC QN AUTO: 30.7 PG (ref 27–33)
MCHC RBC AUTO-ENTMCNC: 32.2 G/DL (ref 33.6–35)
MCV RBC AUTO: 95.1 FL (ref 81.4–97.8)
MONOCYTES # BLD: 0.64 K/UL (ref 0–0.85)
MONOCYTES NFR BLD AUTO: 11.7 % (ref 0–13.4)
NEUTROPHILS # BLD: 2.72 K/UL (ref 2–7.15)
NEUTROPHILS NFR BLD AUTO: 49.8 % (ref 44–72)
NRBC # BLD AUTO: 0 K/UL
NRBC BLD-RTO: 0 /100 WBC
PHOSPHATE SERPL-MCNC: 4.1 MG/DL (ref 2.5–4.5)
PLATELET # BLD AUTO: 234 K/UL (ref 164–446)
PMV BLD AUTO: 11 FL (ref 9–12.9)
POTASSIUM SERPL-SCNC: 4.8 MMOL/L (ref 3.6–5.5)
RBC # BLD AUTO: 4.73 M/UL (ref 4.2–5.4)
SODIUM SERPL-SCNC: 139 MMOL/L (ref 135–145)
WBC # BLD AUTO: 5.5 K/UL (ref 4.8–10.8)

## 2017-03-30 PROCEDURE — 84100 ASSAY OF PHOSPHORUS: CPT

## 2017-03-30 PROCEDURE — 36415 COLL VENOUS BLD VENIPUNCTURE: CPT

## 2017-03-30 PROCEDURE — 82150 ASSAY OF AMYLASE: CPT | Mod: 91

## 2017-03-30 PROCEDURE — 80197 ASSAY OF TACROLIMUS: CPT

## 2017-03-30 PROCEDURE — 80048 BASIC METABOLIC PNL TOTAL CA: CPT

## 2017-03-30 PROCEDURE — 85025 COMPLETE CBC W/AUTO DIFF WBC: CPT

## 2017-03-30 PROCEDURE — 83735 ASSAY OF MAGNESIUM: CPT

## 2017-03-30 PROCEDURE — 82150 ASSAY OF AMYLASE: CPT

## 2017-03-31 LAB
AMYLASE 24H UR-CRATE: 5943.6 U/HR (ref 1–17)
AMYLASE UR-CCNC: ABNORMAL U/L
CREAT 24H UR-MCNC: 43 MG/DL
CREAT 24H UR-MRATE: 1032 MG/D (ref 500–1400)
SPECIMEN VOL ?TM UR: 1200 ML

## 2017-04-01 LAB — TACROLIMUS BLD-MCNC: 9.1 NG/ML

## 2017-05-10 DIAGNOSIS — N17.9 ACUTE KIDNEY FAILURE, UNSPECIFIED (HCC): Primary | ICD-10-CM

## 2017-05-10 RX ORDER — SODIUM BICARBONATE 650 MG/1
TABLET ORAL
Qty: 120 TAB | Refills: 0 | Status: SHIPPED | OUTPATIENT
Start: 2017-05-10 | End: 2017-06-09 | Stop reason: SDUPTHER

## 2017-05-10 NOTE — TELEPHONE ENCOUNTER
Was the patient seen in the last year in this department? Yes  2/23/17    Does patient have an active prescription for medications requested? No     Received Request Via: Pharmacy

## 2017-06-08 ENCOUNTER — HOSPITAL ENCOUNTER (OUTPATIENT)
Dept: RADIOLOGY | Facility: MEDICAL CENTER | Age: 67
End: 2017-06-08
Attending: FAMILY MEDICINE
Payer: MEDICARE

## 2017-06-08 DIAGNOSIS — N95.8 OTHER SPECIFIED MENOPAUSAL AND POSTMENOPAUSAL DISORDER: ICD-10-CM

## 2017-06-08 DIAGNOSIS — Z13.820 OSTEOPOROSIS SCREENING: ICD-10-CM

## 2017-06-08 DIAGNOSIS — Z12.31 ENCOUNTER FOR SCREENING MAMMOGRAM FOR BREAST CANCER: ICD-10-CM

## 2017-06-08 PROCEDURE — 77080 DXA BONE DENSITY AXIAL: CPT

## 2017-06-08 PROCEDURE — 77063 BREAST TOMOSYNTHESIS BI: CPT

## 2017-06-09 DIAGNOSIS — N18.9 CHRONIC KIDNEY DISEASE, UNSPECIFIED: Primary | ICD-10-CM

## 2017-06-14 RX ORDER — SODIUM BICARBONATE 650 MG/1
TABLET ORAL
Qty: 120 TAB | Refills: 11 | Status: SHIPPED | OUTPATIENT
Start: 2017-06-14

## 2017-06-20 ENCOUNTER — HOSPITAL ENCOUNTER (OUTPATIENT)
Dept: LAB | Facility: MEDICAL CENTER | Age: 67
End: 2017-06-20
Attending: INTERNAL MEDICINE
Payer: MEDICARE

## 2017-06-20 DIAGNOSIS — E21.1 HYPERPARATHYROIDISM DUE TO VITAMIN D DEFICIENCY (HCC): ICD-10-CM

## 2017-06-20 DIAGNOSIS — D64.9 ANEMIA, UNSPECIFIED TYPE: ICD-10-CM

## 2017-06-20 DIAGNOSIS — N17.9 AKI (ACUTE KIDNEY INJURY) (HCC): ICD-10-CM

## 2017-06-20 DIAGNOSIS — N18.30 CHRONIC KIDNEY DISEASE (CKD), STAGE III (MODERATE) (HCC): ICD-10-CM

## 2017-06-20 DIAGNOSIS — T86.10 COMPLICATION OF TRANSPLANTED KIDNEY, UNSPECIFIED COMPLICATION: ICD-10-CM

## 2017-06-20 LAB
25(OH)D3 SERPL-MCNC: 31 NG/ML (ref 30–100)
AMYLASE SERPL-CCNC: 61 U/L (ref 20–103)
ANION GAP SERPL CALC-SCNC: 8 MMOL/L (ref 0–11.9)
ANION GAP SERPL CALC-SCNC: 9 MMOL/L (ref 0–11.9)
BASOPHILS # BLD AUTO: 0.2 % (ref 0–1.8)
BASOPHILS # BLD: 0.01 K/UL (ref 0–0.12)
BUN SERPL-MCNC: 41 MG/DL (ref 8–22)
BUN SERPL-MCNC: 41 MG/DL (ref 8–22)
CALCIUM SERPL-MCNC: 9.5 MG/DL (ref 8.5–10.5)
CALCIUM SERPL-MCNC: 9.6 MG/DL (ref 8.5–10.5)
CHLORIDE SERPL-SCNC: 109 MMOL/L (ref 96–112)
CHLORIDE SERPL-SCNC: 109 MMOL/L (ref 96–112)
CO2 SERPL-SCNC: 22 MMOL/L (ref 20–33)
CO2 SERPL-SCNC: 22 MMOL/L (ref 20–33)
CREAT SERPL-MCNC: 1.37 MG/DL (ref 0.5–1.4)
CREAT SERPL-MCNC: 1.37 MG/DL (ref 0.5–1.4)
CREAT UR-MCNC: 65.1 MG/DL
EOSINOPHIL # BLD AUTO: 0 K/UL (ref 0–0.51)
EOSINOPHIL NFR BLD: 0 % (ref 0–6.9)
ERYTHROCYTE [DISTWIDTH] IN BLOOD BY AUTOMATED COUNT: 45.7 FL (ref 35.9–50)
ERYTHROCYTE [DISTWIDTH] IN BLOOD BY AUTOMATED COUNT: 46.1 FL (ref 35.9–50)
GFR SERPL CREATININE-BSD FRML MDRD: 38 ML/MIN/1.73 M 2
GFR SERPL CREATININE-BSD FRML MDRD: 38 ML/MIN/1.73 M 2
GLUCOSE SERPL-MCNC: 80 MG/DL (ref 65–99)
GLUCOSE SERPL-MCNC: 80 MG/DL (ref 65–99)
HCT VFR BLD AUTO: 44.7 % (ref 37–47)
HCT VFR BLD AUTO: 45.3 % (ref 37–47)
HGB BLD-MCNC: 14.5 G/DL (ref 12–16)
HGB BLD-MCNC: 14.8 G/DL (ref 12–16)
IMM GRANULOCYTES # BLD AUTO: 0.02 K/UL (ref 0–0.11)
IMM GRANULOCYTES NFR BLD AUTO: 0.3 % (ref 0–0.9)
LYMPHOCYTES # BLD AUTO: 2.24 K/UL (ref 1–4.8)
LYMPHOCYTES NFR BLD: 39.1 % (ref 22–41)
MAGNESIUM SERPL-MCNC: 1.8 MG/DL (ref 1.5–2.5)
MCH RBC QN AUTO: 30 PG (ref 27–33)
MCH RBC QN AUTO: 30.1 PG (ref 27–33)
MCHC RBC AUTO-ENTMCNC: 32.4 G/DL (ref 33.6–35)
MCHC RBC AUTO-ENTMCNC: 32.7 G/DL (ref 33.6–35)
MCV RBC AUTO: 92.3 FL (ref 81.4–97.8)
MCV RBC AUTO: 92.5 FL (ref 81.4–97.8)
MICROALBUMIN UR-MCNC: <0.7 MG/DL
MICROALBUMIN/CREAT UR: NORMAL MG/G (ref 0–30)
MONOCYTES # BLD AUTO: 0.71 K/UL (ref 0–0.85)
MONOCYTES NFR BLD AUTO: 12.4 % (ref 0–13.4)
NEUTROPHILS # BLD AUTO: 2.75 K/UL (ref 2–7.15)
NEUTROPHILS NFR BLD: 48 % (ref 44–72)
NRBC # BLD AUTO: 0 K/UL
NRBC BLD AUTO-RTO: 0 /100 WBC
PHOSPHATE SERPL-MCNC: 4 MG/DL (ref 2.5–4.5)
PLATELET # BLD AUTO: 199 K/UL (ref 164–446)
PLATELET # BLD AUTO: 199 K/UL (ref 164–446)
PMV BLD AUTO: 11.4 FL (ref 9–12.9)
PMV BLD AUTO: 11.6 FL (ref 9–12.9)
POTASSIUM SERPL-SCNC: 4.3 MMOL/L (ref 3.6–5.5)
POTASSIUM SERPL-SCNC: 4.6 MMOL/L (ref 3.6–5.5)
PTH-INTACT SERPL-MCNC: 94.7 PG/ML (ref 14–72)
RBC # BLD AUTO: 4.83 M/UL (ref 4.2–5.4)
RBC # BLD AUTO: 4.91 M/UL (ref 4.2–5.4)
SODIUM SERPL-SCNC: 139 MMOL/L (ref 135–145)
SODIUM SERPL-SCNC: 140 MMOL/L (ref 135–145)
WBC # BLD AUTO: 5.7 K/UL (ref 4.8–10.8)
WBC # BLD AUTO: 5.9 K/UL (ref 4.8–10.8)

## 2017-06-20 PROCEDURE — 82150 ASSAY OF AMYLASE: CPT | Mod: 91

## 2017-06-20 PROCEDURE — 36415 COLL VENOUS BLD VENIPUNCTURE: CPT

## 2017-06-20 PROCEDURE — 83735 ASSAY OF MAGNESIUM: CPT

## 2017-06-20 PROCEDURE — 84100 ASSAY OF PHOSPHORUS: CPT

## 2017-06-20 PROCEDURE — 80048 BASIC METABOLIC PNL TOTAL CA: CPT

## 2017-06-20 PROCEDURE — 82150 ASSAY OF AMYLASE: CPT

## 2017-06-20 PROCEDURE — 85025 COMPLETE CBC W/AUTO DIFF WBC: CPT

## 2017-06-22 ENCOUNTER — OFFICE VISIT (OUTPATIENT)
Dept: NEPHROLOGY | Facility: MEDICAL CENTER | Age: 67
End: 2017-06-22
Payer: MEDICARE

## 2017-06-22 VITALS
HEIGHT: 62 IN | RESPIRATION RATE: 12 BRPM | DIASTOLIC BLOOD PRESSURE: 78 MMHG | TEMPERATURE: 97.9 F | BODY MASS INDEX: 25.95 KG/M2 | SYSTOLIC BLOOD PRESSURE: 130 MMHG | HEART RATE: 61 BPM | WEIGHT: 141 LBS

## 2017-06-22 DIAGNOSIS — D84.9 IMMUNOSUPPRESSED STATUS (HCC): ICD-10-CM

## 2017-06-22 DIAGNOSIS — I10 ESSENTIAL HYPERTENSION: ICD-10-CM

## 2017-06-22 DIAGNOSIS — N18.30 CKD (CHRONIC KIDNEY DISEASE), STAGE III (HCC): ICD-10-CM

## 2017-06-22 DIAGNOSIS — E55.9 VITAMIN D DEFICIENCY DISEASE: ICD-10-CM

## 2017-06-22 DIAGNOSIS — T86.10 COMPLICATION OF TRANSPLANTED KIDNEY, UNSPECIFIED COMPLICATION: ICD-10-CM

## 2017-06-22 DIAGNOSIS — E87.20 METABOLIC ACIDOSIS: ICD-10-CM

## 2017-06-22 LAB
AMYLASE 24H UR-CRATE: 5211.2 U/HR (ref 1–17)
AMYLASE UR-CCNC: ABNORMAL U/L
CREAT 24H UR-MCNC: 47 MG/DL
CREAT 24H UR-MRATE: 1104 MG/D (ref 500–1400)
SPECIMEN VOL ?TM UR: 1175 ML

## 2017-06-22 PROCEDURE — 99214 OFFICE O/P EST MOD 30 MIN: CPT | Performed by: INTERNAL MEDICINE

## 2017-06-22 RX ORDER — METOPROLOL TARTRATE 50 MG/1
25 TABLET, FILM COATED ORAL 2 TIMES DAILY
Qty: 180 TAB | Refills: 3 | Status: SHIPPED | OUTPATIENT
Start: 2017-06-22

## 2017-06-22 NOTE — MR AVS SNAPSHOT
"        Jo-Ann Cabrera   2017 10:45 AM   Office Visit   MRN: [id number]    Department:  [facility]   Dept Phone:  [phone]    Description:  Female : 1950   Provider:  Hiwot Brenner M.D.           Reason for Visit     Follow-Up           Allergies as of 2017     Allergen Noted Reactions    Codeine 2013   Rash    Rxn = unknown    Rapamune 2013       unk reaction    Statins [Hmg-Coa-R Inhibitors] 2015       LFT elevation    Pneumococcal Vaccine 2014       States cold symptoms      You were diagnosed with     Complication of transplanted kidney, unspecified complication   [1804261]       Immunosuppressed status (CMS-[facility])   [729958]       Metabolic acidosis   [724784]       CKD (chronic kidney disease), stage III   [516293]       Essential hypertension   [7735544]       Vitamin D deficiency disease   [890730]         Vital Signs     Blood Pressure Pulse Temperature Respirations Height Weight    130/78 mmHg 61 36.6 °C (97.9 °F) (Temporal) 12 1.575 m (5' 2\") 63.957 kg (141 lb)    Body Mass Index Last Menstrual Period Smoking Status             25.78 kg/m2 2000 Never Smoker          Basic Information     Date Of Birth Sex Race Ethnicity Preferred Language    1950 Female White Non- English      Your appointments     Sep 28, 2017 11:00 AM   Follow Up Visit with Hiwot Brenner M.D.   [facility] ([address] [place] [address])    [facility] [address], [address]  [facility] [address] [phone]           You will be receiving a confirmation call a few days before your appointment from our automated call confirmation system.              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51 Low  8/8/2014 - Present    Hypoalbuminemia E88.09 Low  8/8/2014 - Present    Gastroenteritis K52.9 High  8/8/2014 - Present    Dyslipidemia E78.5 Low  8/8/2014 - Present    DM retinopathy (CMS-HCC) E11.319 Low  8/28/2014 - Present    Lower urinary tract infectious disease N39.0 High  9/21/2014 - Present    Metabolic acidosis E87.2 Medium  10/2/2014 - Present    Anemia D64.9 Medium  10/2/2014 - Present    HTN (hypertension) I10 Low  11/6/2014 - Present    UTI N39.0 High  11/20/2014 - Present    Intractable nausea and vomiting R11.2   11/20/2014 - Present    Hypokalemia E87.6   12/10/2014 - Present    Chronic kidney disease (CKD), stage III (moderate) N18.3   1/6/2015 - Present    CKD (chronic kidney disease), stage III N18.3   3/13/2015 - Present    DVT (deep venous thrombosis) (CMS-HCC) I82.409 High  11/13/2015 - Present    DVT (deep venous thrombosis), left I82.402   12/21/2015 - Present    Hyperparathyroidism due to vitamin D deficiency (CMS-HCC) E21.1   4/7/2016 - Present    Vitamin deficiency E56.9   4/7/2016 - Present    Hyponatremia E87.1 Medium  7/25/2016 - Present    IRISH (acute kidney injury) (CMS-HCC) N17.9 High  7/26/2016 - Present    CKD (chronic kidney disease) N18.9 Medium  7/26/2016 - Present    Immunosuppressed status (CMS-HCC) D89.9 Low  7/26/2016 - Present    History of deep venous thrombosis Z86.718 Low  7/26/2016 - Present    GERD (gastroesophageal reflux disease) K21.9 Low  7/26/2016 - Present    CAD (coronary artery disease) I25.10 Medium  7/26/2016 - Present    Glaucoma H40.9 Low  7/26/2016 - Present     History of renal transplantation Z94.0 Low  7/26/2016 - Present    Vitamin D deficiency disease E55.9   11/17/2016 - Present    ARF (acute renal failure) (CMS-HCC) N17.9   11/17/2016 - Present    DM type 1 (diabetes mellitus, type 1) (CMS-HCC) E10.9 Medium  11/18/2016 - Present    Type 1 diabetes mellitus with kidney complication (CMS-HCC) E10.29   12/1/2016 - Present    Essential hypertension I10   1/19/2017 - Present      Health Maintenance        Date Due Completion Dates    A1C SCREENING 1950 ---    DIABETES MONOFILAMENT / LE EXAM 1950 ---    IMM DTaP/Tdap/Td Vaccine (1 - Tdap) 6/23/1967 ---    PAP SMEAR 6/23/1969 ---    IMM ZOSTER VACCINE 6/23/2008 ---    RETINAL SCREENING 2/10/2015 2/10/2014 (Prv Comp)    Override on 2/10/2014: Previously completed    IMM PNEUMOCOCCAL 65+ (ADULT) HIGH/HIGHEST RISK SERIES (2 of 2 - PCV13) 9/22/2015 9/22/2014    FASTING LIPID PROFILE 6/14/2017 6/14/2016, 11/18/2015, 7/9/2015, 6/30/2015, 3/6/2015, 5/22/2014, 11/20/2013    MAMMOGRAM 6/8/2018 6/8/2017, 5/27/2016, 5/14/2015, 4/2/2014, 3/25/2012 (Prv Comp)    Override on 3/25/2012: Previously completed    URINE ACR / MICROALBUMIN 6/20/2018 6/20/2017    SERUM CREATININE 6/20/2018 6/20/2017, 6/20/2017, 3/30/2017, 2/21/2017, 1/26/2017, 1/19/2017, 1/16/2017, 12/27/2016, 11/20/2016, 11/19/2016, 11/18/2016, 11/17/2016, 11/17/2016, 9/22/2016, 8/9/2016, 7/27/2016, 7/26/2016, 7/25/2016, 7/24/2016, 6/14/2016, 6/14/2016, 6/14/2016, 3/15/2016, 12/15/2015, 11/17/2015, 11/16/2015, 11/15/2015, 11/14/2015, 11/13/2015, 10/1/2015, 9/1/2015, 8/25/2015, 7/9/2015, 6/30/2015, 6/30/2015, 3/6/2015, 3/6/2015, 1/2/2015, 12/11/2014, 12/10/2014, 12/9/2014, 12/8/2014, 12/7/2014, 12/7/2014, 12/6/2014, 12/3/2014, 12/3/2014, 11/26/2014, 11/25/2014, 11/24/2014, 11/23/2014, 11/22/2014, 11/21/2014, 11/21/2014, 11/20/2014, 10/30/2014, 9/29/2014, 9/25/2014, 9/24/2014, 9/23/2014, 9/22/2014, 9/21/2014, 8/13/2014, 8/12/2014, 8/11/2014, 8/10/2014, 8/9/2014,  8/8/2014, 8/7/2014, 7/29/2014, 6/2/2014, 5/29/2014, 5/23/2014, 5/22/2014, 5/21/2014, 12/16/2013, 12/9/2013, 11/21/2013, 11/20/2013, 11/19/2013, 10/31/2013, 9/11/2013    COLONOSCOPY 11/25/2020 11/25/2010 (Prv Comp)    Override on 11/25/2010: Previously completed    BONE DENSITY 6/8/2022 6/8/2017            Current Immunizations     Influenza TIV (IM) 11/25/2013    Influenza Vaccine Adult HD 10/13/2015, 11/22/2014  9:53 AM    Pneumococcal polysaccharide vaccine (PPSV-23) 9/22/2014 12:45 AM      Below and/or attached are the medications your provider expects you to take. Review all of your home medications and newly ordered medications with your provider and/or pharmacist. Follow medication instructions as directed by your provider and/or pharmacist. Please keep your medication list with you and share with your provider. Update the information when medications are discontinued, doses are changed, or new medications (including over-the-counter products) are added; and carry medication information at all times in the event of emergency situations     Allergies:  CODEINE - Rash     RAPAMUNE - (reactions not documented)     STATINS - (reactions not documented)     PNEUMOCOCCAL VACCINE - (reactions not documented)               Medications  Valid as of: June 22, 2017 - 10:46 AM    Generic Name Brand Name Tablet Size Instructions for use    Aspirin (Chew Tab) ASA 81 MG Take 81 mg by mouth every morning.        Cholecalciferol (Tab) cholecalciferol 1000 UNIT Take 1,000 Units by mouth every day.        Dorzolamide HCl-Timolol Mal (Solution) COSOPT 22.3-6.8 MG/ML Place 1 Drop in both eyes every day.        Fludrocortisone Acetate (Tab) FLORINEF 0.1 MG Take 1 Tab by mouth every morning.        Metoprolol Tartrate (Tab) LOPRESSOR 50 MG TAKE ONE-HALF TABLET BY MOUTH TWICE DAILY        Metoprolol Tartrate (Tab) LOPRESSOR 50 MG TAKE ONE-HALF TABLET BY MOUTH TWICE DAILY        Metoprolol Tartrate (Tab) LOPRESSOR 50 MG Take 0.5 Tabs  by mouth 2 times a day.        Multiple Vitamins-Minerals   Take 1 Cap by mouth every day.        Omeprazole (CAPSULE DELAYED RELEASE) PRILOSEC 40 MG Take 1 Cap by mouth 2 Times a Day.        PredniSONE (Tab) DELTASONE 10 MG Take 10 mg by mouth every day.        Pyridoxine HCl   Take 1 Tab by mouth every day.        Sodium Bicarbonate   Take 10 g by mouth 3 times a day.        Sodium Bicarbonate (Tab) SODIUM BICARBONATE 650 MG TAKE TWO TABLETS BY MOUTH TWICE DAILY        Tacrolimus (Cap) PROGRAF 1 MG Take 2 mg by mouth 2 times a day.        .                 Medicines prescribed today were sent to:     [facility] [address] [place] [address] - [address] [id number] [facility] [address]    Phone: [phone] Fax: [phone]    Open 24 Hours?: No      Medication refill instructions:       If your prescription bottle indicates you have medication refills left, it is not necessary to call your provider’s office. Please contact your pharmacy and they will refill your medication.    If your prescription bottle indicates you do not have any refills left, you may request refills at any time through one of the following ways: The online [employer] system (except Urgent Care), by calling your provider’s office, or by asking your pharmacy to contact your provider’s office with a refill request. Medication refills are processed only during regular business hours and may not be available until the next business day. Your provider may request additional information or to have a follow-up visit with you prior to refilling your medication.   *Please Note: Medication refills are assigned a new Rx number when refilled electronically. Your pharmacy may indicate that no refills were authorized even though a new prescription for the same medication is available at the pharmacy. Please request the medicine by name with the pharmacy before contacting your provider for a refill.        Your To Do List     Future  Labs/Procedures Complete By Expires    BASIC METABOLIC PANEL  As directed 12/20/2017    PTH INTACT (PTH ONLY)  As directed 6/23/2018         Compare And Share Access Code: [email or web address]  Expires: 7/20/2017  9:13 AM    Compare And Share  A secure, online tool to manage your health information     [employer] Compare And Share® is a secure, online tool that connects you to your personalized health information from the privacy of your home -- day or night - making it very easy for you to manage your healthcare. Once the activation process is completed, you can even access your medical information using the Compare And Share lavern, which is available for free in the Apple Lavern store or Google Play store.     Compare And Share provides the following levels of access (as shown below):   My Chart Features   [facility] Doctor [facility]  Urgent  Care Non-[facility]  Primary Care  Doctor   Email your healthcare team securely and privately 24/7 X X X    Manage appointments: schedule your next appointment; view details of past/upcoming appointments X      Request prescription refills. X      View recent personal medical records, including lab and immunizations X X X X   View health record, including health history, allergies, medications X X X X   Read reports about your outpatient visits, procedures, consult and ER notes X X X X   See your discharge summary, which is a recap of your hospital and/or ER visit that includes your diagnosis, lab results, and care plan. X X       How to register for Compare And Share:  1. Go to  [email or web address].  2. Click on the Sign Up Now box, which takes you to the New Member Sign Up page. You will need to provide the following information:  a. Enter your Compare And Share Access Code exactly as it appears at the top of this page. (You will not need to use this code after you’ve completed the sign-up process. If you do not sign up before the expiration date, you must request a new code.)   b. Enter your date of birth.    c. Enter your home email address.   d. Click Submit, and follow the next screen’s instructions.  3. Create a UrtheCastt ID. This will be your [employer] login ID and cannot be changed, so think of one that is secure and easy to remember.  4. Create a UrtheCastt password. You can change your password at any time.  5. Enter your Password Reset Question and Answer. This can be used at a later time if you forget your password.   6. Enter your e-mail address. This allows you to receive e-mail notifications when new information is available in [employer]Sift.  7. Click Sign Up. You can now view your health information.    For assistance activating your [employer] account, call [phone]

## 2017-06-22 NOTE — PROGRESS NOTES
"Subjective:      Jo-Ann Cabrera is a 66 y.o. female who presents with Follow-Up and Chronic Kidney Disease            HPI  Jo-Ann is coming today for f/u of CKD III,K/P transplant,  metabolic acidosis  Has hx/of dual renal/pancrease transplant in 2007, [place]  Doing well , no complaints  No dysuria/hematuria  CKD III:creat better -to 1.4 -1.37  HTN: BP well controlled.  Metabolic acidosis well controlled -at 21    Review of Systems   Constitutional: Improved malaise/fatigue. Negative for fever and chills.   HENT: Negative for congestion and neck pain.    Eyes: Negative for blurred vision and double vision.   Respiratory: Negative for cough, hemoptysis and wheezing.    Cardiovascular: Negative for chest pain, palpitations, orthopnea and leg swelling.   Gastrointestinal: Positive for nausea, vomiting and abdominal pain.   Genitourinary: Positivefor dysuria, urgency, frequency, hematuria and flank pain.   Musculoskeletal: Negative for myalgias and back pain.   Neurological: Negative for dizziness and headaches.     PMH/SH/FH/allergies and medications reviewed     Objective:     /78 mmHg  Pulse 61  Temp(Src) 36.6 °C (97.9 °F) (Temporal)  Resp 12  Ht 1.575 m (5' 2\")  Wt 63.957 kg (141 lb)  BMI 25.78 kg/m2  LMP 11/08/2000     Physical Exam   Constitutional: She is oriented to person, place, and time. She appears well-developed. No distress.        Thin female   HENT:   Head: Normocephalic and atraumatic.   Nose: Nose normal.   Mouth/Throat: Oropharynx is clear and moist.   Eyes: Conjunctivae normal and EOM are normal. Pupils are equal, round, and reactive to light. No scleral icterus.   Neck: Normal range of motion. Neck supple. No JVD present.   Cardiovascular: Normal rate and regular rhythm.  Exam reveals no gallop and no friction rub.    Pulmonary/Chest: Effort normal and breath sounds normal. No respiratory distress. She has no wheezes. She has no rales.   Abdominal: Soft. Bowel sounds are normal. " She exhibits no distension. There is tenderness in epigastric area   Musculoskeletal: She exhibits no edema and no tenderness.   Lymphadenopathy:     She has no cervical adenopathy.   Neurological: She is alert and oriented to person, place, and time. No cranial nerve deficit. Coordination normal.             Laboratory results reviewed: d/w patient  Lab Results   Component Value Date/Time    CREATININE 1.22 6/2/2014  8:04 AM    POTASSIUM 4.1 6/2/2014  8:04 AM   Serum creatinine level from 06/23/14 1.6, Hb 10    Lab Results   Component Value Date/Time    CREATININE 1.63* 9/29/2014  9:30 AM    POTASSIUM 4.2 9/29/2014  9:30 AM     Lab Results   Component Value Date/Time    CREATININE 2.07* 10/30/2014 11:58 AM    POTASSIUM 4.2 10/30/2014 11:58 AM   CO2 14    Lab Results   Component Value Date/Time    CREATININE 1.54* 1/2/2015 12:05 PM    POTASSIUM 4.5 1/2/2015 12:05 PM   CO2 15    Lab Results   Component Value Date/Time    CREATININE 1.40 3/6/2015  9:40 AM    POTASSIUM 4.2 3/6/2015  9:40 AM   CO2 21    Lab Results   Component Value Date/Time    CREATININE 1.37 06/20/2017 09:50 AM    POTASSIUM 4.6 06/20/2017 09:50 AM               Assessment/Plan:     1. 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 Chronic kidney disease (CKD), stage III (moderate)                Creatinine better -to 1.1.37        3. HTN (hypertension)   BP well controlled    4. Anemia   Hb level stable    5. Vitamin D deficiency disease  - vit D level below the goal -to monitor              Continue supplementation    6.         Metabolic acidosis - well controlled with Na HCO3      Recs: Continue current treatment             Monitor BP             Keep well hydrated             F/u in 3  months with BMP

## 2017-07-25 ENCOUNTER — HOSPITAL ENCOUNTER (OUTPATIENT)
Dept: RADIOLOGY | Facility: MEDICAL CENTER | Age: 67
End: 2017-07-25
Attending: FAMILY MEDICINE
Payer: MEDICARE

## 2017-07-25 DIAGNOSIS — R94.39 OTHER NONSPECIFIC ABNORMAL CARDIOVASCULAR SYSTEM FUNCTION STUDY: ICD-10-CM

## 2017-07-25 DIAGNOSIS — I73.9 PERIPHERAL VASCULAR DISEASE, UNSPECIFIED (HCC): ICD-10-CM

## 2017-07-25 PROCEDURE — 93926 LOWER EXTREMITY STUDY: CPT | Mod: 26 | Performed by: SURGERY

## 2017-07-25 PROCEDURE — 93922 UPR/L XTREMITY ART 2 LEVELS: CPT | Mod: 26 | Performed by: SURGERY

## 2017-07-25 PROCEDURE — 93922 UPR/L XTREMITY ART 2 LEVELS: CPT

## 2017-07-25 PROCEDURE — 93926 LOWER EXTREMITY STUDY: CPT

## 2021-01-15 DIAGNOSIS — Z23 NEED FOR VACCINATION: ICD-10-CM

## 2024-09-04 ENCOUNTER — APPOINTMENT (RX ONLY)
Dept: URBAN - METROPOLITAN AREA CLINIC 134 | Facility: CLINIC | Age: 74
Setting detail: DERMATOLOGY
End: 2024-09-04

## 2024-09-04 DIAGNOSIS — Z71.89 OTHER SPECIFIED COUNSELING: ICD-10-CM

## 2024-09-04 DIAGNOSIS — L81.4 OTHER MELANIN HYPERPIGMENTATION: ICD-10-CM

## 2024-09-04 DIAGNOSIS — L82.1 OTHER SEBORRHEIC KERATOSIS: ICD-10-CM

## 2024-09-04 DIAGNOSIS — D22 MELANOCYTIC NEVI: ICD-10-CM

## 2024-09-04 PROBLEM — D22.5 MELANOCYTIC NEVI OF TRUNK: Status: ACTIVE | Noted: 2024-09-04

## 2024-09-04 PROCEDURE — ? SUNSCREEN RECOMMENDATIONS

## 2024-09-04 PROCEDURE — 99203 OFFICE O/P NEW LOW 30 MIN: CPT

## 2024-09-04 PROCEDURE — ? COUNSELING

## 2024-09-04 ASSESSMENT — LOCATION SIMPLE DESCRIPTION DERM
LOCATION SIMPLE: LEFT UPPER BACK
LOCATION SIMPLE: LEFT CHEEK
LOCATION SIMPLE: LEFT FOREARM
LOCATION SIMPLE: RIGHT SHOULDER

## 2024-09-04 ASSESSMENT — LOCATION ZONE DERM
LOCATION ZONE: TRUNK
LOCATION ZONE: FACE
LOCATION ZONE: ARM

## 2024-09-04 ASSESSMENT — LOCATION DETAILED DESCRIPTION DERM
LOCATION DETAILED: LEFT CENTRAL MALAR CHEEK
LOCATION DETAILED: LEFT MID-UPPER BACK
LOCATION DETAILED: LEFT PROXIMAL DORSAL FOREARM
LOCATION DETAILED: RIGHT POSTERIOR SHOULDER

## 2024-09-04 ASSESSMENT — PAIN INTENSITY VAS: HOW INTENSE IS YOUR PAIN 0 BEING NO PAIN, 10 BEING THE MOST SEVERE PAIN POSSIBLE?: NO PAIN

## 2025-09-04 ENCOUNTER — APPOINTMENT (OUTPATIENT)
Dept: URBAN - METROPOLITAN AREA CLINIC 134 | Facility: CLINIC | Age: 75
Setting detail: DERMATOLOGY
End: 2025-09-04

## 2025-09-04 DIAGNOSIS — L81.4 OTHER MELANIN HYPERPIGMENTATION: ICD-10-CM

## 2025-09-04 DIAGNOSIS — L82.1 OTHER SEBORRHEIC KERATOSIS: ICD-10-CM

## 2025-09-04 DIAGNOSIS — Z71.89 OTHER SPECIFIED COUNSELING: ICD-10-CM

## 2025-09-04 DIAGNOSIS — D22 MELANOCYTIC NEVI: ICD-10-CM

## 2025-09-04 PROBLEM — D22.5 MELANOCYTIC NEVI OF TRUNK: Status: ACTIVE | Noted: 2025-09-04

## 2025-09-04 PROCEDURE — ? SUNSCREEN RECOMMENDATIONS

## 2025-09-04 PROCEDURE — ? COUNSELING

## 2025-09-04 ASSESSMENT — LOCATION SIMPLE DESCRIPTION DERM
LOCATION SIMPLE: RIGHT UPPER BACK
LOCATION SIMPLE: LEFT FOREARM
LOCATION SIMPLE: RIGHT SHOULDER
LOCATION SIMPLE: LEFT UPPER BACK
LOCATION SIMPLE: CHEST
LOCATION SIMPLE: LEFT SHOULDER

## 2025-09-04 ASSESSMENT — LOCATION DETAILED DESCRIPTION DERM
LOCATION DETAILED: RIGHT INFERIOR UPPER BACK
LOCATION DETAILED: MIDDLE STERNUM
LOCATION DETAILED: LEFT PROXIMAL DORSAL FOREARM
LOCATION DETAILED: LEFT MID-UPPER BACK
LOCATION DETAILED: RIGHT POSTERIOR SHOULDER
LOCATION DETAILED: LEFT POSTERIOR SHOULDER

## 2025-09-04 ASSESSMENT — LOCATION ZONE DERM
LOCATION ZONE: ARM
LOCATION ZONE: TRUNK